# Patient Record
Sex: MALE | HISPANIC OR LATINO | Employment: UNEMPLOYED | URBAN - METROPOLITAN AREA
[De-identification: names, ages, dates, MRNs, and addresses within clinical notes are randomized per-mention and may not be internally consistent; named-entity substitution may affect disease eponyms.]

---
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
## 2024-01-11 ENCOUNTER — OFFICE VISIT (OUTPATIENT)
Dept: PEDIATRICS | Facility: CLINIC | Age: 19
End: 2024-01-11
Payer: COMMERCIAL

## 2024-01-11 VITALS
SYSTOLIC BLOOD PRESSURE: 113 MMHG | WEIGHT: 214.29 LBS | DIASTOLIC BLOOD PRESSURE: 75 MMHG | HEART RATE: 65 BPM | HEIGHT: 68 IN | BODY MASS INDEX: 32.48 KG/M2

## 2024-01-11 DIAGNOSIS — E78.1 HIGH BLOOD TRIGLYCERIDES: ICD-10-CM

## 2024-01-11 DIAGNOSIS — E66.811 CLASS 1 OBESITY: ICD-10-CM

## 2024-01-11 DIAGNOSIS — K76.0 NAFLD (NONALCOHOLIC FATTY LIVER DISEASE): ICD-10-CM

## 2024-01-11 DIAGNOSIS — E78.6 LOW HDL (UNDER 40): ICD-10-CM

## 2024-01-11 DIAGNOSIS — L83 ACANTHOSIS NIGRICANS: ICD-10-CM

## 2024-01-11 DIAGNOSIS — Z23 NEED FOR INFLUENZA VACCINATION: Primary | ICD-10-CM

## 2024-01-11 PROCEDURE — 99214 OFFICE O/P EST MOD 30 MIN: CPT | Performed by: PEDIATRICS

## 2024-01-11 PROCEDURE — G0463 HOSPITAL OUTPT CLINIC VISIT: HCPCS | Performed by: PEDIATRICS

## 2024-01-11 SDOH — ECONOMIC STABILITY: FOOD INSECURITY: WITHIN THE PAST 12 MONTHS, THE FOOD YOU BOUGHT JUST DIDN'T LAST AND YOU DIDN'T HAVE MONEY TO GET MORE.: NEVER TRUE

## 2024-01-11 SDOH — ECONOMIC STABILITY: FOOD INSECURITY: WITHIN THE PAST 12 MONTHS, YOU WORRIED THAT YOUR FOOD WOULD RUN OUT BEFORE YOU GOT MONEY TO BUY MORE.: NEVER TRUE

## 2024-01-11 ASSESSMENT — PATIENT HEALTH QUESTIONNAIRE - PHQ9: SUM OF ALL RESPONSES TO PHQ QUESTIONS 1-9: 1

## 2024-01-11 ASSESSMENT — PAIN SCALES - GENERAL: PAINLEVEL: NO PAIN (0)

## 2024-01-11 NOTE — LETTER
2024      RE: Bartolo Dickson  [address]  [address]  [facility] [address]     Dear Colleague,    Thank you for the opportunity to participate in the care of your patient, Bartolo Dickson, at the [facility] PED[facility]ATRIC SPECIAL[facility] CLINIC at [facility]. Please see a copy of my visit note below.          Date: 01/10/2024    PATIENT:  Bartolo Dickson  :          2005  PATEL:          2024    Dear Carol Ann Ricks:    I had the pleasure of seeing your patient, Bartolo Dickson, for a follow-up visit in the [facility] on 2024 at the [facility]. Please see below for my assessment and plan of care.    As you may recall, Bartolo is a 18 year old young man with class 1 obesity complicated by mild dyslipidemia and NAFLD.  Bartolo was last seen in this clinic 2 mos ago.  Bartolo was accompanied to today's appointment by his mom..    Intercurrent History:    Completed 4 injections of 0.5 mg Wegovy and none since then due to national shortage.  No side fefects.    Working 32-40 hours/week at [employer] with father who .  Graduated from high school.      He needs GI follow-up for NAFLD.  This has not been scheduled.    Diet/Appetite:  - unchanged    Activity:  - was going to gym - 3 days per week for a month, but has not been going lately.      Current Medications:  Current Outpatient Rx   Medication Sig Dispense Refill     Semaglutide-Weight Management (WEGOVY) 0.25 MG/0.5ML pen Inject 0.25 mg Subcutaneous once a week (Patient not taking: Reported on 2023) 2 mL 0     Semaglutide-Weight Management (WEGOVY) 0.5 MG/0.5ML pen Inject 0.5 mg Subcutaneous once a week 2 mL 0     Semaglutide-Weight Management (WEGOVY) 1 MG/0.5ML pen Inject 1 mg Subcutaneous once a week 2 mL 0     Semaglutide-Weight Management  "(WEGOVY) 1.7 MG/0.75ML pen Inject 1.7 mg Subcutaneous once a week 3 mL 0       Physical Exam:    Vitals:  /75 (BP Location: Right arm, Patient Position: Sitting, Cuff Size: Adult Large)   Pulse 65   Ht 1.73 m (5' 8.11\")   Wt 97.2 kg (214 lb 4.6 oz)   BMI 32.48 kg/m    B/P:   BP Readings from Last 1 Encounters:   01/11/24 113/75     BP:  Blood pressure %wing are not available for patients who are 18 years or older.  P:   Pulse Readings from Last 1 Encounters:   01/11/24 65       Weight:  Wt Readings from Last 4 Encounters:   01/11/24 97.2 kg (214 lb 4.6 oz) (96%, Z= 1.76)*   11/09/23 96.4 kg (212 lb 8.4 oz) (96%, Z= 1.74)*   08/31/23 100.7 kg (222 lb 0.1 oz) (97%, Z= 1.94)*   04/14/22 94.2 kg (207 lb 10.8 oz) (96%, Z= 1.78)*     * Growth percentiles are based on CDC (Boys, 2-20 Years) data.     Height:    Ht Readings from Last 4 Encounters:   01/11/24 1.73 m (5' 8.11\") (30%, Z= -0.52)*   11/09/23 1.72 m (5' 7.72\") (26%, Z= -0.66)*   08/31/23 1.729 m (5' 8.07\") (30%, Z= -0.52)*   04/14/22 1.713 m (5' 7.44\") (26%, Z= -0.65)*     * Growth percentiles are based on CDC (Boys, 2-20 Years) data.       Body Mass Index:  Body mass index is 32.48 kg/m .  Body Mass Index Percentile:  96 %ile (Z= 1.79) based on CDC (Boys, 2-20 Years) BMI-for-age based on BMI available as of 1/11/2024.    Labs/Studies:    7/13/2023:  , A1c 5.3%    8/29/22  MR Elastography without Contrast  Order: [id number]  Impression    1.  Diffuse hepatic steatosis.    2.  Previously described sonographic mass the right hepatic lobe corresponds with an area of suspected focal fatty sparing. No evidence of suspicious enhancement or diffusion restriction. For ongoing surveillance, recommend an additional follow-up hepatic ultrasound in 3-6 months for further evaluation.    3.  Focal fatty sparing near the gallbladder fossa, accounting for the additional sonographic abnormalities. No further follow-up required.    Assessment:      Bartolo is a 18 " year old male with class 1 obesity complicated by mild dyslipidemia and MASLD.  Wt increased a couple pounds over the past 2 mos.  He was only able to get up to Wegovy 0.5 mg weekly before he ran out due to national supply shortage and has not had any since then.  We reviewed that about 10% BMI reduction is needed to improve MASLD.  We discussed other anti obesity medications but he prefers to not start any at this time.  He will be meeting with GI next mos and I encouraged him to follow-up with adult wt mgmt clinic.    Bartolo s current problem list reviewed today includes:    Encounter Diagnoses   Name Primary?     Class 1 obesity      NAFLD (nonalcoholic fatty liver disease)      Need for influenza vaccination Yes     Acanthosis nigricans      High blood triglycerides      Low HDL (under 40)       Care Plan:  Severe Obesity:   - Continue Lifestyle modification therapy, encouraged resuming regular workouts at the gym with his brother.  - Plan transition to adult weight management.      NAFLD:  - weight management as noted above  - emphasized the need for follow-up of NAFLD and mass in right hepatic lobe  - GI referral scheduled for Feb.    Thank you for including me in the care of your patient.  Please do not hesitate to call with questions or concerns.    30 min spent on the date of the encounter in chart review, patient visit, review of tests, documentation and/or discussion with other providers about the issues documented above.         Sincerely,      Mary Romero MD MPH  Diplomate, American Board of Obesity Medicine    Director, Pediatric Weight Management Clinic  Department of Pediatrics  [facility] [phone]  [facility] [phone]  [facility], [facility] [phone]  [facility] [place] [phone]            CC  Copy to patient  Meg galvez   [address]  [facility] [place] [address]      Please do not hesitate to contact me if you have any questions/concerns.     Sincerely,       Mary Romero MD, MD

## 2024-01-11 NOTE — PROGRESS NOTES
"      Date: 01/10/2024    PATIENT:  Bartolo Dickson  :          2005  PATEL:          2024    Dear Carol Ann Ricks:    I had the pleasure of seeing your patient, Bartolo Dickson, for a follow-up visit in the [facility] on 2024 at the [facility]. Please see below for my assessment and plan of care.    As you may recall, Bartolo is a 18 year old young man with class 1 obesity complicated by mild dyslipidemia and NAFLD.  Bartolo was last seen in this clinic 2 mos ago.  Bartolo was accompanied to today's appointment by his mom..    Intercurrent History:    Completed 4 injections of 0.5 mg Wegovy and none since then due to national shortage.  No side fefects.    Working 32-40 hours/week at [employer] with father who .  Graduated from high school.      He needs GI follow-up for NAFLD.  This has not been scheduled.    Diet/Appetite:  - unchanged    Activity:  - was going to gym - 3 days per week for a month, but has not been going lately.      Current Medications:  Current Outpatient Rx   Medication Sig Dispense Refill    Semaglutide-Weight Management (WEGOVY) 0.25 MG/0.5ML pen Inject 0.25 mg Subcutaneous once a week (Patient not taking: Reported on 2023) 2 mL 0    Semaglutide-Weight Management (WEGOVY) 0.5 MG/0.5ML pen Inject 0.5 mg Subcutaneous once a week 2 mL 0    Semaglutide-Weight Management (WEGOVY) 1 MG/0.5ML pen Inject 1 mg Subcutaneous once a week 2 mL 0    Semaglutide-Weight Management (WEGOVY) 1.7 MG/0.75ML pen Inject 1.7 mg Subcutaneous once a week 3 mL 0       Physical Exam:    Vitals:  /75 (BP Location: Right arm, Patient Position: Sitting, Cuff Size: Adult Large)   Pulse 65   Ht 1.73 m (5' 8.11\")   Wt 97.2 kg (214 lb 4.6 oz)   BMI 32.48 kg/m    B/P:   BP Readings from Last 1 Encounters:   24 113/75     BP:  Blood pressure %wing are not available " "for patients who are 18 years or older.  P:   Pulse Readings from Last 1 Encounters:   01/11/24 65       Weight:  Wt Readings from Last 4 Encounters:   01/11/24 97.2 kg (214 lb 4.6 oz) (96%, Z= 1.76)*   11/09/23 96.4 kg (212 lb 8.4 oz) (96%, Z= 1.74)*   08/31/23 100.7 kg (222 lb 0.1 oz) (97%, Z= 1.94)*   04/14/22 94.2 kg (207 lb 10.8 oz) (96%, Z= 1.78)*     * Growth percentiles are based on CDC (Boys, 2-20 Years) data.     Height:    Ht Readings from Last 4 Encounters:   01/11/24 1.73 m (5' 8.11\") (30%, Z= -0.52)*   11/09/23 1.72 m (5' 7.72\") (26%, Z= -0.66)*   08/31/23 1.729 m (5' 8.07\") (30%, Z= -0.52)*   04/14/22 1.713 m (5' 7.44\") (26%, Z= -0.65)*     * Growth percentiles are based on CDC (Boys, 2-20 Years) data.       Body Mass Index:  Body mass index is 32.48 kg/m .  Body Mass Index Percentile:  96 %ile (Z= 1.79) based on CDC (Boys, 2-20 Years) BMI-for-age based on BMI available as of 1/11/2024.    Labs/Studies:    7/13/2023:  , A1c 5.3%    8/29/22  MR Elastography without Contrast  Order: [id number]  Impression    1.  Diffuse hepatic steatosis.    2.  Previously described sonographic mass the right hepatic lobe corresponds with an area of suspected focal fatty sparing. No evidence of suspicious enhancement or diffusion restriction. For ongoing surveillance, recommend an additional follow-up hepatic ultrasound in 3-6 months for further evaluation.    3.  Focal fatty sparing near the gallbladder fossa, accounting for the additional sonographic abnormalities. No further follow-up required.    Assessment:      Bartolo is a 18 year old male with class 1 obesity complicated by mild dyslipidemia and MASLD.  Wt increased a couple pounds over the past 2 mos.  He was only able to get up to Wegovy 0.5 mg weekly before he ran out due to national supply shortage and has not had any since then.  We reviewed that about 10% BMI reduction is needed to improve MASLD.  We discussed other anti obesity medications but he " prefers to not start any at this time.  He will be meeting with GI next mos and I encouraged him to follow-up with adult wt mgmt clinic.    Bartolo s current problem list reviewed today includes:    Encounter Diagnoses   Name Primary?    Class 1 obesity     NAFLD (nonalcoholic fatty liver disease)     Need for influenza vaccination Yes    Acanthosis nigricans     High blood triglycerides     Low HDL (under 40)       Care Plan:  Severe Obesity:   - Continue Lifestyle modification therapy, encouraged resuming regular workouts at the gym with his brother.  - Plan transition to adult weight management.      NAFLD:  - weight management as noted above  - emphasized the need for follow-up of NAFLD and mass in right hepatic lobe  - GI referral scheduled for Feb.    Thank you for including me in the care of your patient.  Please do not hesitate to call with questions or concerns.    30 min spent on the date of the encounter in chart review, patient visit, review of tests, documentation and/or discussion with other providers about the issues documented above.         Sincerely,      Mary Romero MD MPH  Diplomate, American Board of Obesity Medicine    Director, Pediatric Weight Management Clinic  Department of Pediatrics  [facility] [phone]  [facility] [phone]  [facility], [facility] [phone]  [facility] [place] [phone]            CC  Copy to patient  Meg galvez   [address]   [facility] [address]

## 2024-01-11 NOTE — NURSING NOTE
"[facility] [[id number]]  Chief Complaint   Patient presents with    Follow Up     Weight management     Initial /75 (BP Location: Right arm, Patient Position: Sitting, Cuff Size: Adult Large)   Pulse 65   Ht 5' 8.11\" (173 cm)   Wt 214 lb 4.6 oz (97.2 kg)   BMI 32.48 kg/m   Estimated body mass index is 32.48 kg/m  as calculated from the following:    Height as of this encounter: 5' 8.11\" (173 cm).    Weight as of this encounter: 214 lb 4.6 oz (97.2 kg).  Medication Reconciliation: complete    Does the patient need any medication refills today? Yes    Does the patient/parent need MyChart or Proxy acces today? Yes    Does the patient want a flu shot today? Yes-                Wt Readings from Last 4 Encounters:   01/11/24 214 lb 4.6 oz (97.2 kg) (96%, Z= 1.76)*   11/09/23 212 lb 8.4 oz (96.4 kg) (96%, Z= 1.74)*   08/31/23 222 lb 0.1 oz (100.7 kg) (97%, Z= 1.94)*   04/14/22 207 lb 10.8 oz (94.2 kg) (96%, Z= 1.78)*     * Growth percentiles are based on CDC (Boys, 2-20 Years) data.   Peds Outpatient BP  1) Rested for 5 minutes, BP taken on bare arm, patient sitting (or supine for infants) w/ legs uncrossed?   Yes  2) Right arm used?  Right arm   Yes  3) Arm circumference of largest part of upper arm (in cm): 37.5 cm  4) BP cuff sized used: Large Adult (32-43cm)   If used different size cuff then what was recommended why? N/A  5) First BP reading:machine   BP Readings from Last 1 Encounters:   01/11/24 113/75      Is reading >90%?No   (90% for <1 years is 90/50)  (90% for >18 years is 140/90)  *If a machine BP is at or above 90% take manual BP  6) Manual BP reading: N/A  7) Other comments: None    Chiquis Todd MA.    "

## 2024-01-11 NOTE — LETTER
2024       RE: Bartolo Dickson  [address]  [address]  [facility] [address]     Dear Colleague,    Thank you for referring your patient, Bartolo Dickson, to the [facility] PEDIATRIC SPECIALTY CLINIC at [facility]. Please see a copy of my visit note below.          Date: 01/10/2024    PATIENT:  Bartolo Dickson  :          2005  PATEL:          2024    Dear Carol Ann Ricks:    I had the pleasure of seeing your patient, Bartolo Dickson, for a follow-up visit in the [facility] on 2024 at the [facility]. Please see below for my assessment and plan of care.    As you may recall, Bartolo is a 18 year old young man with class 1 obesity complicated by mild dyslipidemia and NAFLD.  Bartolo was last seen in this clinic 2 mos ago.  Bartolo was accompanied to today's appointment by his mom..    Intercurrent History:    Completed 4 injections of 0.5 mg Wegovy and none since then due to national shortage.  No side fefects.    Working 32-40 hours/week at [employer] with father who .  Graduated from high school.      He needs GI follow-up for NAFLD.  This has not been scheduled.    Diet/Appetite:  - unchanged    Activity:  - was going to gym - 3 days per week for a month, but has not been going lately.      Current Medications:  Current Outpatient Rx   Medication Sig Dispense Refill     Semaglutide-Weight Management (WEGOVY) 0.25 MG/0.5ML pen Inject 0.25 mg Subcutaneous once a week (Patient not taking: Reported on 2023) 2 mL 0     Semaglutide-Weight Management (WEGOVY) 0.5 MG/0.5ML pen Inject 0.5 mg Subcutaneous once a week 2 mL 0     Semaglutide-Weight Management (WEGOVY) 1 MG/0.5ML pen Inject 1 mg Subcutaneous once a week 2 mL 0     Semaglutide-Weight Management (WEGOVY) 1.7 MG/0.75ML pen Inject 1.7 mg  "Subcutaneous once a week 3 mL 0       Physical Exam:    Vitals:  /75 (BP Location: Right arm, Patient Position: Sitting, Cuff Size: Adult Large)   Pulse 65   Ht 1.73 m (5' 8.11\")   Wt 97.2 kg (214 lb 4.6 oz)   BMI 32.48 kg/m    B/P:   BP Readings from Last 1 Encounters:   01/11/24 113/75     BP:  Blood pressure %wing are not available for patients who are 18 years or older.  P:   Pulse Readings from Last 1 Encounters:   01/11/24 65       Weight:  Wt Readings from Last 4 Encounters:   01/11/24 97.2 kg (214 lb 4.6 oz) (96%, Z= 1.76)*   11/09/23 96.4 kg (212 lb 8.4 oz) (96%, Z= 1.74)*   08/31/23 100.7 kg (222 lb 0.1 oz) (97%, Z= 1.94)*   04/14/22 94.2 kg (207 lb 10.8 oz) (96%, Z= 1.78)*     * Growth percentiles are based on CDC (Boys, 2-20 Years) data.     Height:    Ht Readings from Last 4 Encounters:   01/11/24 1.73 m (5' 8.11\") (30%, Z= -0.52)*   11/09/23 1.72 m (5' 7.72\") (26%, Z= -0.66)*   08/31/23 1.729 m (5' 8.07\") (30%, Z= -0.52)*   04/14/22 1.713 m (5' 7.44\") (26%, Z= -0.65)*     * Growth percentiles are based on CDC (Boys, 2-20 Years) data.       Body Mass Index:  Body mass index is 32.48 kg/m .  Body Mass Index Percentile:  96 %ile (Z= 1.79) based on CDC (Boys, 2-20 Years) BMI-for-age based on BMI available as of 1/11/2024.    Labs/Studies:    7/13/2023:  , A1c 5.3%    8/29/22  MR Elastography without Contrast  Order: [id number]  Impression    1.  Diffuse hepatic steatosis.    2.  Previously described sonographic mass the right hepatic lobe corresponds with an area of suspected focal fatty sparing. No evidence of suspicious enhancement or diffusion restriction. For ongoing surveillance, recommend an additional follow-up hepatic ultrasound in 3-6 months for further evaluation.    3.  Focal fatty sparing near the gallbladder fossa, accounting for the additional sonographic abnormalities. No further follow-up required.    Assessment:      Bartolo is a 18 year old male with class 1 obesity " complicated by mild dyslipidemia and MASLD.  Wt increased a couple pounds over the past 2 mos.  He was only able to get up to Wegovy 0.5 mg weekly before he ran out due to national supply shortage and has not had any since then.  We reviewed that about 10% BMI reduction is needed to improve MASLD.  We discussed other anti obesity medications but he prefers to not start any at this time.  He will be meeting with GI next mos and I encouraged him to follow-up with adult wt mgmt clinic.    Bartolo s current problem list reviewed today includes:    Encounter Diagnoses   Name Primary?     Class 1 obesity      NAFLD (nonalcoholic fatty liver disease)      Need for influenza vaccination Yes     Acanthosis nigricans      High blood triglycerides      Low HDL (under 40)       Care Plan:  Severe Obesity:   - Continue Lifestyle modification therapy, encouraged resuming regular workouts at the gym with his brother.  - Plan transition to adult weight management.      NAFLD:  - weight management as noted above  - emphasized the need for follow-up of NAFLD and mass in right hepatic lobe  - GI referral scheduled for Feb.    Thank you for including me in the care of your patient.  Please do not hesitate to call with questions or concerns.    30 min spent on the date of the encounter in chart review, patient visit, review of tests, documentation and/or discussion with other providers about the issues documented above.         Sincerely,      Mary Romero MD MPH  Diplomate, American Board of Obesity Medicine    Director, Pediatric Weight Management Clinic  Department of Pediatrics  [facility] [phone]  [facility] [phone]  [facility], [facility] [phone]  [facility] [place] [phone]            CC  Copy to patient  Meg galvez   [address]   [place] [address]      Again,  thank you for allowing me to participate in the care of your patient.      Sincerely,    Mary Romero MD, MD

## 2024-01-11 NOTE — PATIENT INSTRUCTIONS
Aashish Potts MD   [address] [place] [address]   Phone: [phone]   Fax: [phone]    Diagnoses: Class 1 obesity   NAFLD (nonalcoholic fatty liver disease)   Order: Adult Comprehensive Weight Management  Referral       Comment: Please be aware that coverage of these services is subject to the terms and limitations of your health insurance plan.  Call member services at your health plan with any benefit or coverage questions.   [employer] will call you to coordinate your care as prescribed by the provider.  If you don t hear from a representative within 2 business days, please call [phone].

## 2024-01-15 ENCOUNTER — APPOINTMENT (OUTPATIENT)
Dept: INTERPRETER SERVICES | Facility: CLINIC | Age: 19
End: 2024-01-15
Payer: COMMERCIAL

## 2024-02-01 ENCOUNTER — DOCUMENTATION ONLY (OUTPATIENT)
Dept: GASTROENTEROLOGY | Facility: CLINIC | Age: 19
End: 2024-02-01
Payer: COMMERCIAL

## 2024-02-01 DIAGNOSIS — R74.8 ELEVATED LIVER ENZYMES: ICD-10-CM

## 2024-02-01 DIAGNOSIS — K76.0 NAFLD (NONALCOHOLIC FATTY LIVER DISEASE): Primary | ICD-10-CM

## 2024-02-01 NOTE — PROGRESS NOTES
Patient has upcoming lab only appointment for PV[facility], please review and place future orders that may needed. If the lab is not needed, please let your care team follow up with patient.    Thank you    [facility] lab

## 2024-02-07 ENCOUNTER — LAB (OUTPATIENT)
Dept: LAB | Facility: CLINIC | Age: 19
End: 2024-02-07
Payer: COMMERCIAL

## 2024-02-07 DIAGNOSIS — K76.0 NAFLD (NONALCOHOLIC FATTY LIVER DISEASE): ICD-10-CM

## 2024-02-07 DIAGNOSIS — R74.8 ELEVATED LIVER ENZYMES: ICD-10-CM

## 2024-02-07 LAB
ERYTHROCYTE [DISTWIDTH] IN BLOOD BY AUTOMATED COUNT: 11.2 % (ref 10–15)
HCT VFR BLD AUTO: 47.1 % (ref 40–53)
HGB BLD-MCNC: 16 G/DL (ref 13.3–17.7)
INR PPP: 1.05 (ref 0.85–1.15)
MCH RBC QN AUTO: 29.9 PG (ref 26.5–33)
MCHC RBC AUTO-ENTMCNC: 34 G/DL (ref 31.5–36.5)
MCV RBC AUTO: 88 FL (ref 78–100)
PLATELET # BLD AUTO: 236 10E3/UL (ref 150–450)
RBC # BLD AUTO: 5.36 10E6/UL (ref 4.4–5.9)
WBC # BLD AUTO: 7.1 10E3/UL (ref 4–11)

## 2024-02-07 PROCEDURE — 36415 COLL VENOUS BLD VENIPUNCTURE: CPT

## 2024-02-07 PROCEDURE — 85610 PROTHROMBIN TIME: CPT

## 2024-02-07 PROCEDURE — 80053 COMPREHEN METABOLIC PANEL: CPT

## 2024-02-07 PROCEDURE — 86704 HEP B CORE ANTIBODY TOTAL: CPT

## 2024-02-07 PROCEDURE — 82248 BILIRUBIN DIRECT: CPT

## 2024-02-07 PROCEDURE — 85027 COMPLETE CBC AUTOMATED: CPT

## 2024-02-07 PROCEDURE — 86706 HEP B SURFACE ANTIBODY: CPT

## 2024-02-08 LAB
ALBUMIN SERPL BCG-MCNC: 4.8 G/DL (ref 3.5–5.2)
ALP SERPL-CCNC: 78 U/L (ref 65–260)
ALT SERPL W P-5'-P-CCNC: 65 U/L (ref 0–50)
ANION GAP SERPL CALCULATED.3IONS-SCNC: 10 MMOL/L (ref 7–15)
AST SERPL W P-5'-P-CCNC: 31 U/L (ref 0–35)
BILIRUB DIRECT SERPL-MCNC: <0.2 MG/DL (ref 0–0.3)
BILIRUB SERPL-MCNC: 0.4 MG/DL
BUN SERPL-MCNC: 8.5 MG/DL (ref 6–20)
CALCIUM SERPL-MCNC: 9.8 MG/DL (ref 8.6–10)
CHLORIDE SERPL-SCNC: 104 MMOL/L (ref 98–107)
CREAT SERPL-MCNC: 0.65 MG/DL (ref 0.67–1.17)
DEPRECATED HCO3 PLAS-SCNC: 27 MMOL/L (ref 22–29)
EGFRCR SERPLBLD CKD-EPI 2021: >90 ML/MIN/1.73M2
GLUCOSE SERPL-MCNC: 87 MG/DL (ref 70–99)
HBV CORE AB SERPL QL IA: NONREACTIVE
HBV SURFACE AB SERPL IA-ACNC: 4 M[IU]/ML
HBV SURFACE AB SERPL IA-ACNC: NONREACTIVE M[IU]/ML
POTASSIUM SERPL-SCNC: 4.5 MMOL/L (ref 3.4–5.3)
PROT SERPL-MCNC: 7.6 G/DL (ref 6.4–8.3)
SODIUM SERPL-SCNC: 141 MMOL/L (ref 135–145)

## 2024-02-14 ENCOUNTER — PRE VISIT (OUTPATIENT)
Dept: GASTROENTEROLOGY | Facility: CLINIC | Age: 19
End: 2024-02-14
Payer: COMMERCIAL

## 2024-02-14 ENCOUNTER — VIRTUAL VISIT (OUTPATIENT)
Dept: GASTROENTEROLOGY | Facility: CLINIC | Age: 19
End: 2024-02-14
Attending: PEDIATRICS
Payer: COMMERCIAL

## 2024-02-14 VITALS — BODY MASS INDEX: 32.13 KG/M2 | WEIGHT: 212 LBS

## 2024-02-14 DIAGNOSIS — K76.0 NAFLD (NONALCOHOLIC FATTY LIVER DISEASE): ICD-10-CM

## 2024-02-14 DIAGNOSIS — E66.811 CLASS 1 OBESITY: ICD-10-CM

## 2024-02-14 PROCEDURE — 99204 OFFICE O/P NEW MOD 45 MIN: CPT | Mod: 95 | Performed by: PHYSICIAN ASSISTANT

## 2024-02-14 ASSESSMENT — PAIN SCALES - GENERAL: PAINLEVEL: NO PAIN (0)

## 2024-02-14 NOTE — PROGRESS NOTES
Harjinder is a 18 year old who is being evaluated via a billable video visit.      Video-Visit Details    Type of service:  Video Visit   Joined the call at 2/14/2024, 12:33:00 pm.  Left the call at 2/14/2024, 12:54:38 pm.  Originating Location (pt. Location): Home    Distant Location (provider location):  Off-site  Platform used for Video Visit: [facility]     Hepatology Clinic note  Bartolo Dickson   Date of Birth 2005    REASON FOR CONSULTATION: MAFLD  REFERRING PROVIDER: Damaris Mead MD           Assessment/plan:   Bartolo Dickson is a 18 year old male with elevated ALT/AST and imaging findings of hepatic steatosis. Risk factors for fatty liver disease includes: obesity, insulin resistance with acanthosis nicrogans and dyslipidemia. Transaminases have been historically elevate dating back to age 10.  He has otherwise had extensive hepatobiliary workup previously.  No stigmata of advanced liver disease.    #Metabolic associated fatty liver disease (MALFD):   - Patient currently has few risk factors for fibrosis development in future, especially history of elevated transaminases at a young age. We discussed the pathophysiology and natural history of nonalcoholic fatty liver disease and cirrhosis.   - If Elastography not completed in 2022, will obtain a fibrosis scan to evaluate any fibrosis, which is important in risk stratification of likelihood of on-going fibrosis without weight loss.   - Consider/recommend follow up with non-invasive elastography or FibroScan in 3-5 years   - Recommend FIB-4 yearly with PCP. FIB-4 Calculation: 0.31 at 2/7/2024 11:30 AM  - Recommend slow gradual weight loss. Discussed how a weight loss of 3-5% can show improvement on steatosis and weight loss of 7-10% can show improvement on fibrosis on histology.     # Dyslipidemia/Obesity   - Maintain good control of cholesterol (No contraindication to starting a statin with LFT elevations, would actually be preferred)   -  Continue routine follow up with PCP and recommend aggressive screening for risk factors (blood glucose, cholesterol, sleep apnea, thyroid):   - Optimize blood glucose as needed. Agree with GLP-1 inhibitor for both insulin resistance and help with weight loss, which he tolerated well.   - Continue limiting carbohydrates, especially simple carbohydrates, and following Mediterranean eating patten  - Increase physical activity as able, ideally 150 minutes weekly  - Limit alcohol intake to not more than 3 drinks a week (not currently drinking)    # Not immune to Hepatitis B:   - Recommend Hep B booster vaccination     - Follow up with Hepatology in as needed if found to have advanced fibrosis       Pippa Mccoy PA-C   [facility] Hepatology     -----------------------------------------------------       HPI:   Bartolo Dickson is a 18 year old male  presenting for the evaluation of elevated LFT's.     Patient has been followed in the pediatric weight management clinic for the past 8 years. Patient started Wegovy in September 2023, and dose was meant to be titrated up in December, unfortunately doses not able to be started due to national shortage of prescription.    He presents today to establish care for hepatic steatosis.  Appears MR elastography is attempted in August 2022, but did not see any actual elastography results.     Eat when have to eat. Less cravings with Wegovy.  He is currently trying to avoid fast foods.     Per chart review weight peaked in August 2023 at which time he was 2022 pounds with BMI of 33.  HAd weight loss with Wegovy down to 212 and then weight did go up a little bit after Wegovy discontinued. Weight trending down again. Have been trying to be more active.   Light exercises at home. Also does some lifting at work, on feet all day.     Patient denies jaundice, lower extremity edema, abdominal distension or confusion.  Patient also denies melena, hematochezia or  hematemesis.  Patient denies weight loss, fevers, sweats or chills.    PMH:   Mild dyslipidemia, NAFLD and obesity    SMH:    has no past surgical history on file.     Medications:   Nothing currently     No previous tobacco use. No alcohol. No previous IV/IN drug use.  Currently work - [employer] - full time . Patient currently lives with mom. No known family history of liver disease or liver cancer.     Previous work-up:   Lab Results   Component Value Date    HEPBANG Nonreactive 01/23/2020    HBCAB Nonreactive 02/07/2024    AUSAB 4.00 02/07/2024    HCVAB Nonreactive 01/23/2020    NARAYAN 80 01/23/2020    TTG <1 01/23/2020    TTGG <1 01/23/2020     01/23/2020    CER 25 01/23/2020    ANH Negative 01/23/2020    A1A 125 01/23/2020    TSH 0.81 01/21/2021    CHOL 160 01/17/2019    HDL 35 (L) 01/17/2019    LDL 83 01/17/2019    TRIG 211 (H) 01/17/2019    A1C 4.9 10/14/2021        Recent Labs   Lab Test 02/07/24  1130 10/14/21  1609 01/21/21  1616 10/22/20  1509 01/23/20  1627 08/15/19  1717 01/17/19  0955 06/01/18  1120 01/12/17  1656 08/19/16  0759   ALKPHOS 78  --   --   --  163  --   --   --   --   --    ALT 65* 60* 79* 203* 66* 153* 73* 37 26 87*   AST 31 28 26 65* 26 62* 37* 23 19 46*   BILITOTAL 0.4  --   --   --  0.5  --   --   --   --   --            Allergies:   No Known Allergies         Social History:     Social History     Socioeconomic History    Marital status: Single     Spouse name: Not on file    Number of children: Not on file    Years of education: Not on file    Highest education level: Not on file   Occupational History    Not on file   Tobacco Use    Smoking status: Never     Passive exposure: Never    Smokeless tobacco: Never   Substance and Sexual Activity    Alcohol use: Never    Drug use: Never    Sexual activity: Not on file   Other Topics Concern    Not on file   Social History Narrative    Not on file     Social Determinants of Health     Financial Resource Strain: Not on file   Food  Insecurity: Not on file   Transportation Needs: Not on file   Physical Activity: Not on file   Stress: Not on file   Social Connections: Not on file   Interpersonal Safety: Not on file   Housing Stability: Not on file            Family History:   No family history on file.         Review of Systems:   Gen: See HPI     HEENT: No change in vision or hearing, mouth sores, dysphagia, lymph nodes  Resp: No shortness of breath, coughing, hx of asthma  CV: No chest pain, palpitations, syncope   GI: See HPI  : No dysuria, history of stones, urine color    Skin: No rash; no pruritus or psoriasis  MS: No arthralgias, myalgias, joint swelling  Neuro: No memory changes, confusion, numbness    Heme: No difficulty clotting, bruising, bleeding  Psych:  No anxiety, depression, agitation          Physical Exam:   GENERAL: healthy, alert and no distress  EYES: Eyes grossly normal to inspection, conjunctivae and sclerae normal  RESP: no audible wheeze, cough, or visible cyanosis.  No visible retractions or increased work of breathing.  Able to speak fully in complete sentences  NEURO: Cranial nerves grossly intact, mentation intact and speech normal  PSYCH: mentation appears normal, affect normal/bright, judgement and insight intact, normal speech and appearance well-groomed         Data:   Reviewed in person and significant for:    Lab Results   Component Value Date     02/07/2024      Lab Results   Component Value Date    POTASSIUM 4.5 02/07/2024     Lab Results   Component Value Date    CHLORIDE 104 02/07/2024     Lab Results   Component Value Date    CO2 27 02/07/2024     Lab Results   Component Value Date    BUN 8.5 02/07/2024     Lab Results   Component Value Date    CR 0.65 02/07/2024       Lab Results   Component Value Date    WBC 7.1 02/07/2024     Lab Results   Component Value Date    HGB 16.0 02/07/2024     Lab Results   Component Value Date    HCT 47.1 02/07/2024     Lab Results   Component Value Date    MCV 88  "02/07/2024     Lab Results   Component Value Date     02/07/2024       Lab Results   Component Value Date    AST 31 02/07/2024    AST 26 01/21/2021     Lab Results   Component Value Date    ALT 65 02/07/2024    ALT 79 01/21/2021     No results found for: \"BILICONJ\"   Lab Results   Component Value Date    BILITOTAL 0.4 02/07/2024    BILITOTAL 0.5 01/23/2020       Lab Results   Component Value Date    ALBUMIN 4.8 02/07/2024    ALBUMIN 4.4 01/23/2020     Lab Results   Component Value Date    PROTTOTAL 7.6 02/07/2024    PROTTOTAL 8.3 01/23/2020      Lab Results   Component Value Date    ALKPHOS 78 02/07/2024    ALKPHOS 163 01/23/2020       Lab Results   Component Value Date    INR 1.05 02/07/2024    INR 1.05 01/23/2020         Imaging:        EXAM: MR ABDOMEN LIVER WWO  LOCATION: [facility]  DATE/TIME: 8/29/2022 8:58 AM    INDICATION: Hepatic steatosis. Indeterminate liver lesion from additional imaging modality.  COMPARISON: Ultrasound 08/11/2022  TECHNIQUE: Routine MRI liver protocol including T1 in/out phase, diffusion, multiplane T2, and dynamic T1 with IV contrast.    CONTRAST: Gadavist 10    FINDINGS:    LIVER: No evidence of hepatic cirrhosis. Diffuse fatty infiltration of the hepatic parenchyma is identified. Hyperechoic mass hepatic segment 6/7 (series 8a/image 31). This measures 15 mm and corresponds with the mass described on ultrasound. On opposed phase imaging this demonstrates increased signal. No evidence of diffusion restriction or definitive enhancement. Focal fatty sparing is identified extensively about the gallbladder fossa.    ADDITIONAL FINDINGS: No significant findings in the gallbladder, spleen, pancreas, kidneys, and adrenal glands. No lymphadenopathy. No ascites.        "

## 2024-02-14 NOTE — LETTER
2/14/2024         RE: Bartolo Dickson  [address]  [place] [address]        Dear Colleague,    Thank you for referring your patient, Bartolo Dickson, to the [facility] [place]. Please see a copy of my visit note below.    Harjinder is a 18 year old who is being evaluated via a billable video visit.      Video-Visit Details    Type of service:  Video Visit   Joined the call at 2/14/2024, 12:33:00 pm.  Left the call at 2/14/2024, 12:54:38 pm.  Originating Location (pt. Location): Home    Distant Location (provider location):  Off-site  Platform used for Video Visit: [facility]     Hepatology Clinic note  Bartolo Dickson   Date of Birth 2005    REASON FOR CONSULTATION: MAFLD  REFERRING PROVIDER: Damaris Mead MD           Assessment/plan:   Bartolo Dickson is a 18 year old male with elevated ALT/AST and imaging findings of hepatic steatosis. Risk factors for fatty liver disease includes: obesity, insulin resistance with acanthosis nicrogans and dyslipidemia. Transaminases have been historically elevate dating back to age 10.  He has otherwise had extensive hepatobiliary workup previously.  No stigmata of advanced liver disease.    #Metabolic associated fatty liver disease (MALFD):   - Patient currently has few risk factors for fibrosis development in future, especially history of elevated transaminases at a young age. We discussed the pathophysiology and natural history of nonalcoholic fatty liver disease and cirrhosis.   - If Elastography not completed in 2022, will obtain a fibrosis scan to evaluate any fibrosis, which is important in risk stratification of likelihood of on-going fibrosis without weight loss.   - Consider/recommend follow up with non-invasive elastography or FibroScan in 3-5 years   - Recommend FIB-4 yearly with PCP. FIB-4 Calculation: 0.31 at 2/7/2024 11:30 AM  - Recommend slow gradual weight loss. Discussed how a  weight loss of 3-5% can show improvement on steatosis and weight loss of 7-10% can show improvement on fibrosis on histology.     # Dyslipidemia/Obesity   - Maintain good control of cholesterol (No contraindication to starting a statin with LFT elevations, would actually be preferred)   - Continue routine follow up with PCP and recommend aggressive screening for risk factors (blood glucose, cholesterol, sleep apnea, thyroid):   - Optimize blood glucose as needed. Agree with GLP-1 inhibitor for both insulin resistance and help with weight loss, which he tolerated well.   - Continue limiting carbohydrates, especially simple carbohydrates, and following Mediterranean eating patten  - Increase physical activity as able, ideally 150 minutes weekly  - Limit alcohol intake to not more than 3 drinks a week (not currently drinking)    # Not immune to Hepatitis B:   - Recommend Hep B booster vaccination     - Follow up with Hepatology in as needed if found to have advanced fibrosis       Pippa Mccoy PA-C   [facility] Hepatology     -----------------------------------------------------       HPI:   Bartolo Dickson is a 18 year old male  presenting for the evaluation of elevated LFT's.     Patient has been followed in the pediatric weight management clinic for the past 8 years. Patient started Wegovy in September 2023, and dose was meant to be titrated up in December, unfortunately doses not able to be started due to national shortage of prescription.    He presents today to establish care for hepatic steatosis.  Appears MR elastography is attempted in August 2022, but did not see any actual elastography results.     Eat when have to eat. Less cravings with Wegovy.  He is currently trying to avoid fast foods.     Per chart review weight peaked in August 2023 at which time he was 2022 pounds with BMI of 33.  HAd weight loss with Wegovy down to 212 and then weight did go up a little bit after Wegovy  discontinued. Weight trending down again. Have been trying to be more active.   Light exercises at home. Also does some lifting at work, on feet all day.     Patient denies jaundice, lower extremity edema, abdominal distension or confusion.  Patient also denies melena, hematochezia or hematemesis.  Patient denies weight loss, fevers, sweats or chills.    PMH:   Mild dyslipidemia, NAFLD and obesity    SMH:    has no past surgical history on file.     Medications:   Nothing currently     No previous tobacco use. No alcohol. No previous IV/IN drug use.  Currently work - [employer] - full time . Patient currently lives with mom. No known family history of liver disease or liver cancer.     Previous work-up:   Lab Results   Component Value Date    HEPBANG Nonreactive 01/23/2020    HBCAB Nonreactive 02/07/2024    AUSAB 4.00 02/07/2024    HCVAB Nonreactive 01/23/2020    NARAYAN 80 01/23/2020    TTG <1 01/23/2020    TTGG <1 01/23/2020     01/23/2020    CER 25 01/23/2020    ANH Negative 01/23/2020    A1A 125 01/23/2020    TSH 0.81 01/21/2021    CHOL 160 01/17/2019    HDL 35 (L) 01/17/2019    LDL 83 01/17/2019    TRIG 211 (H) 01/17/2019    A1C 4.9 10/14/2021        Recent Labs   Lab Test 02/07/24  1130 10/14/21  1609 01/21/21  1616 10/22/20  1509 01/23/20  1627 08/15/19  1717 01/17/19  0955 06/01/18  1120 01/12/17  1656 08/19/16  0759   ALKPHOS 78  --   --   --  163  --   --   --   --   --    ALT 65* 60* 79* 203* 66* 153* 73* 37 26 87*   AST 31 28 26 65* 26 62* 37* 23 19 46*   BILITOTAL 0.4  --   --   --  0.5  --   --   --   --   --            Allergies:   No Known Allergies         Social History:     Social History     Socioeconomic History    Marital status: Single     Spouse name: Not on file    Number of children: Not on file    Years of education: Not on file    Highest education level: Not on file   Occupational History    Not on file   Tobacco Use    Smoking status: Never     Passive exposure: Never    Smokeless  tobacco: Never   Substance and Sexual Activity    Alcohol use: Never    Drug use: Never    Sexual activity: Not on file   Other Topics Concern    Not on file   Social History Narrative    Not on file     Social Determinants of Health     Financial Resource Strain: Not on file   Food Insecurity: Not on file   Transportation Needs: Not on file   Physical Activity: Not on file   Stress: Not on file   Social Connections: Not on file   Interpersonal Safety: Not on file   Housing Stability: Not on file            Family History:   No family history on file.         Review of Systems:   Gen: See HPI     HEENT: No change in vision or hearing, mouth sores, dysphagia, lymph nodes  Resp: No shortness of breath, coughing, hx of asthma  CV: No chest pain, palpitations, syncope   GI: See HPI  : No dysuria, history of stones, urine color    Skin: No rash; no pruritus or psoriasis  MS: No arthralgias, myalgias, joint swelling  Neuro: No memory changes, confusion, numbness    Heme: No difficulty clotting, bruising, bleeding  Psych:  No anxiety, depression, agitation          Physical Exam:   GENERAL: healthy, alert and no distress  EYES: Eyes grossly normal to inspection, conjunctivae and sclerae normal  RESP: no audible wheeze, cough, or visible cyanosis.  No visible retractions or increased work of breathing.  Able to speak fully in complete sentences  NEURO: Cranial nerves grossly intact, mentation intact and speech normal  PSYCH: mentation appears normal, affect normal/bright, judgement and insight intact, normal speech and appearance well-groomed         Data:   Reviewed in person and significant for:    Lab Results   Component Value Date     02/07/2024      Lab Results   Component Value Date    POTASSIUM 4.5 02/07/2024     Lab Results   Component Value Date    CHLORIDE 104 02/07/2024     Lab Results   Component Value Date    CO2 27 02/07/2024     Lab Results   Component Value Date    BUN 8.5 02/07/2024     Lab Results  "  Component Value Date    CR 0.65 02/07/2024       Lab Results   Component Value Date    WBC 7.1 02/07/2024     Lab Results   Component Value Date    HGB 16.0 02/07/2024     Lab Results   Component Value Date    HCT 47.1 02/07/2024     Lab Results   Component Value Date    MCV 88 02/07/2024     Lab Results   Component Value Date     02/07/2024       Lab Results   Component Value Date    AST 31 02/07/2024    AST 26 01/21/2021     Lab Results   Component Value Date    ALT 65 02/07/2024    ALT 79 01/21/2021     No results found for: \"BILICONJ\"   Lab Results   Component Value Date    BILITOTAL 0.4 02/07/2024    BILITOTAL 0.5 01/23/2020       Lab Results   Component Value Date    ALBUMIN 4.8 02/07/2024    ALBUMIN 4.4 01/23/2020     Lab Results   Component Value Date    PROTTOTAL 7.6 02/07/2024    PROTTOTAL 8.3 01/23/2020      Lab Results   Component Value Date    ALKPHOS 78 02/07/2024    ALKPHOS 163 01/23/2020       Lab Results   Component Value Date    INR 1.05 02/07/2024    INR 1.05 01/23/2020         Imaging:        EXAM: MR ABDOMEN LIVER WWO  LOCATION: [facility]  DATE/TIME: 8/29/2022 8:58 AM    INDICATION: Hepatic steatosis. Indeterminate liver lesion from additional imaging modality.  COMPARISON: Ultrasound 08/11/2022  TECHNIQUE: Routine MRI liver protocol including T1 in/out phase, diffusion, multiplane T2, and dynamic T1 with IV contrast.    CONTRAST: Gadavist 10    FINDINGS:    LIVER: No evidence of hepatic cirrhosis. Diffuse fatty infiltration of the hepatic parenchyma is identified. Hyperechoic mass hepatic segment 6/7 (series 8a/image 31). This measures 15 mm and corresponds with the mass described on ultrasound. On opposed phase imaging this demonstrates increased signal. No evidence of diffusion restriction or definitive enhancement. Focal fatty sparing is identified extensively about the gallbladder fossa.    ADDITIONAL FINDINGS: No significant findings in the gallbladder, spleen, pancreas, " kidneys, and adrenal glands. No lymphadenopathy. No ascites.            Pippa Mccoy PA-C

## 2024-02-14 NOTE — NURSING NOTE
Is the patient currently in the state of [place]? YES    Visit mode:VIDEO    If the visit is dropped, the patient can be reconnected by: VIDEO VISIT: Text to cell phone:   Telephone Information:   Mobile [phone]       Will anyone else be joining the visit? NO  (If patient encounters technical issues they should call [phone]    How would you like to obtain your AVS? MyChart    Are changes needed to the allergy or medication list? No    Reason for visit: Consult    Renetta FERNANDEZ

## 2024-02-14 NOTE — Clinical Note
Can you see if patient had elastography done at [facility] in 2022 (call radiology) . The order is for elastography, but there are no elastography results. I have a feeling it wasn't able to be done.  Thanks, Pippa

## 2024-02-15 ENCOUNTER — TELEPHONE (OUTPATIENT)
Dept: PEDIATRICS | Facility: CLINIC | Age: 19
End: 2024-02-15
Payer: COMMERCIAL

## 2024-02-15 ENCOUNTER — TELEPHONE (OUTPATIENT)
Dept: GASTROENTEROLOGY | Facility: CLINIC | Age: 19
End: 2024-02-15
Payer: COMMERCIAL

## 2024-02-15 NOTE — TELEPHONE ENCOUNTER
Called [facility] regarding MRI elastography results from 8/29/2022.    Ally reported MRI elastography is not done at [facility]. MRI abdomen was completed.    BHANU NewN, RN, PHN  Hepatology Clinic  [facility]  [facility]

## 2024-02-15 NOTE — TELEPHONE ENCOUNTER
PA Initiation    Medication: WEGOVY 1.7 MG/0.75ML SC SOAJ  Insurance Company: [employer] - Phone [phone] Fax [phone]  Pharmacy Filling the Rx: [facility] - [place], [place] - [facility] [address]   Filling Pharmacy Phone: [phone]  Filling Pharmacy Fax: [phone]  Start Date: 2/15/2024         Thank you,     Luis Padilla [facility]  Pharmacy Clinic [facility]  [email or web address]   Phone: [phone]  Fax: [phone]

## 2024-02-16 NOTE — TELEPHONE ENCOUNTER
Prior Authorization Approval    Medication: WEGOVY 1.7 MG/0.75ML SC SOAJ  Authorization Effective Date: 2/15/2024  Authorization Expiration Date: 2/15/2025  Approved Dose/Quantity: 3 ml per 28 days  Reference #: [id number]   Insurance Company: [employer] - Phone [phone] Fax [phone]  Expected CoPay: $    CoPay Card Available:      Financial Assistance Needed: No  Which Pharmacy is filling the prescription: [facility] - [place] [address]   Pharmacy Notified: No - renewal only  Patient Notified: No - renewal only        Thank you,     Luis Padilla [facility]  [facility] Clinic [facility] [email or web address]   Phone: [phone]  Fax: [phone]

## 2024-02-20 ENCOUNTER — TELEPHONE (OUTPATIENT)
Dept: GASTROENTEROLOGY | Facility: CLINIC | Age: 19
End: 2024-02-20
Payer: COMMERCIAL

## 2024-09-21 ENCOUNTER — HEALTH MAINTENANCE LETTER (OUTPATIENT)
Age: 19
End: 2024-09-21